# Patient Record
Sex: MALE | Race: WHITE | Employment: OTHER | URBAN - METROPOLITAN AREA
[De-identification: names, ages, dates, MRNs, and addresses within clinical notes are randomized per-mention and may not be internally consistent; named-entity substitution may affect disease eponyms.]

---

## 2021-12-10 ENCOUNTER — HOME HEALTH ADMISSION (OUTPATIENT)
Dept: HOME HEALTH SERVICES | Facility: HOME HEALTH | Age: 86
End: 2021-12-10
Payer: MEDICARE

## 2021-12-12 ENCOUNTER — HOME CARE VISIT (OUTPATIENT)
Dept: SCHEDULING | Facility: HOME HEALTH | Age: 86
End: 2021-12-12
Payer: MEDICARE

## 2021-12-12 VITALS
OXYGEN SATURATION: 99 % | TEMPERATURE: 97.8 F | DIASTOLIC BLOOD PRESSURE: 70 MMHG | SYSTOLIC BLOOD PRESSURE: 122 MMHG | RESPIRATION RATE: 18 BRPM | HEART RATE: 84 BPM

## 2021-12-12 PROCEDURE — 400013 HH SOC

## 2021-12-12 PROCEDURE — G0151 HHCP-SERV OF PT,EA 15 MIN: HCPCS

## 2021-12-15 ENCOUNTER — HOME CARE VISIT (OUTPATIENT)
Dept: SCHEDULING | Facility: HOME HEALTH | Age: 86
End: 2021-12-15
Payer: MEDICARE

## 2021-12-15 ENCOUNTER — HOME CARE VISIT (OUTPATIENT)
Dept: HOME HEALTH SERVICES | Facility: HOME HEALTH | Age: 86
End: 2021-12-15
Payer: MEDICARE

## 2021-12-15 VITALS
OXYGEN SATURATION: 98 % | HEART RATE: 78 BPM | RESPIRATION RATE: 18 BRPM | TEMPERATURE: 97.8 F | SYSTOLIC BLOOD PRESSURE: 140 MMHG | DIASTOLIC BLOOD PRESSURE: 82 MMHG

## 2021-12-15 PROCEDURE — G0151 HHCP-SERV OF PT,EA 15 MIN: HCPCS

## 2021-12-21 ENCOUNTER — HOME CARE VISIT (OUTPATIENT)
Dept: SCHEDULING | Facility: HOME HEALTH | Age: 86
End: 2021-12-21
Payer: MEDICARE

## 2021-12-21 VITALS
OXYGEN SATURATION: 98 % | RESPIRATION RATE: 17 BRPM | SYSTOLIC BLOOD PRESSURE: 124 MMHG | DIASTOLIC BLOOD PRESSURE: 78 MMHG | HEART RATE: 78 BPM | TEMPERATURE: 97.4 F

## 2021-12-21 PROCEDURE — G0157 HHC PT ASSISTANT EA 15: HCPCS

## 2021-12-23 ENCOUNTER — HOME CARE VISIT (OUTPATIENT)
Dept: SCHEDULING | Facility: HOME HEALTH | Age: 86
End: 2021-12-23
Payer: MEDICARE

## 2021-12-28 ENCOUNTER — HOSPITAL ENCOUNTER (OUTPATIENT)
Dept: LAB | Age: 86
Discharge: HOME | End: 2021-12-28
Payer: MEDICARE

## 2021-12-28 DIAGNOSIS — D50.0 IRON DEFICIENCY ANEMIA DUE TO CHRONIC BLOOD LOSS: ICD-10-CM

## 2021-12-28 PROBLEM — I10 PRIMARY HYPERTENSION: Status: ACTIVE | Noted: 2021-12-28

## 2021-12-28 PROBLEM — I47.1 ATRIAL TACHYCARDIA (HCC): Status: ACTIVE | Noted: 2021-12-28

## 2021-12-28 LAB — FERRITIN SERPL-MCNC: 26 NG/ML (ref 8–388)

## 2021-12-28 PROCEDURE — 82728 ASSAY OF FERRITIN: CPT

## 2021-12-28 PROCEDURE — 36415 COLL VENOUS BLD VENIPUNCTURE: CPT

## 2021-12-29 ENCOUNTER — HOME CARE VISIT (OUTPATIENT)
Dept: HOME HEALTH SERVICES | Facility: HOME HEALTH | Age: 86
End: 2021-12-29
Payer: MEDICARE

## 2021-12-29 ENCOUNTER — HOME CARE VISIT (OUTPATIENT)
Dept: SCHEDULING | Facility: HOME HEALTH | Age: 86
End: 2021-12-29
Payer: MEDICARE

## 2021-12-29 VITALS
DIASTOLIC BLOOD PRESSURE: 80 MMHG | SYSTOLIC BLOOD PRESSURE: 134 MMHG | TEMPERATURE: 98.6 F | OXYGEN SATURATION: 97 % | HEART RATE: 60 BPM | RESPIRATION RATE: 16 BRPM

## 2021-12-29 PROCEDURE — G0157 HHC PT ASSISTANT EA 15: HCPCS

## 2021-12-31 ENCOUNTER — HOME CARE VISIT (OUTPATIENT)
Dept: SCHEDULING | Facility: HOME HEALTH | Age: 86
End: 2021-12-31
Payer: MEDICARE

## 2021-12-31 VITALS
HEART RATE: 78 BPM | OXYGEN SATURATION: 98 % | RESPIRATION RATE: 18 BRPM | DIASTOLIC BLOOD PRESSURE: 72 MMHG | TEMPERATURE: 97.8 F | SYSTOLIC BLOOD PRESSURE: 132 MMHG

## 2021-12-31 PROCEDURE — G0151 HHCP-SERV OF PT,EA 15 MIN: HCPCS

## 2022-01-07 PROBLEM — N18.30 STAGE 3 CHRONIC KIDNEY DISEASE, UNSPECIFIED WHETHER STAGE 3A OR 3B CKD (HCC): Status: ACTIVE | Noted: 2022-01-07

## 2022-03-18 PROBLEM — I10 PRIMARY HYPERTENSION: Status: ACTIVE | Noted: 2021-12-28

## 2022-03-19 PROBLEM — I47.1 ATRIAL TACHYCARDIA (HCC): Status: ACTIVE | Noted: 2021-12-28

## 2022-03-19 PROBLEM — D50.0 IRON DEFICIENCY ANEMIA DUE TO CHRONIC BLOOD LOSS: Status: ACTIVE | Noted: 2021-12-28

## 2022-03-19 PROBLEM — N18.30 STAGE 3 CHRONIC KIDNEY DISEASE, UNSPECIFIED WHETHER STAGE 3A OR 3B CKD (HCC): Status: ACTIVE | Noted: 2022-01-07

## 2022-04-01 PROBLEM — G30.9 ALZHEIMER'S DISEASE, UNSPECIFIED (CODE) (HCC): Status: ACTIVE | Noted: 2022-04-01

## 2022-04-01 PROBLEM — L98.499: Status: ACTIVE | Noted: 2022-04-01

## 2022-04-01 PROBLEM — R41.9 NEUROCOGNITIVE DISORDER: Status: ACTIVE | Noted: 2022-04-01

## 2022-05-23 RX ORDER — ATENOLOL 25 MG/1
TABLET ORAL
Qty: 90 TABLET | Refills: 1 | Status: SHIPPED | OUTPATIENT
Start: 2022-05-23 | End: 2022-07-08

## 2022-07-01 DIAGNOSIS — I10 PRIMARY HYPERTENSION: Primary | ICD-10-CM

## 2022-07-01 DIAGNOSIS — I10 PRIMARY HYPERTENSION: ICD-10-CM

## 2022-07-01 LAB
ALBUMIN SERPL-MCNC: 4 G/DL (ref 3.2–4.6)
ALBUMIN/GLOB SERPL: 1.5 {RATIO} (ref 1.2–3.5)
ALP SERPL-CCNC: 96 U/L (ref 50–136)
ALT SERPL-CCNC: 19 U/L (ref 12–65)
ANION GAP SERPL CALC-SCNC: 4 MMOL/L (ref 7–16)
AST SERPL-CCNC: 14 U/L (ref 15–37)
BASOPHILS # BLD: 0.1 K/UL (ref 0–0.2)
BASOPHILS NFR BLD: 1 % (ref 0–2)
BILIRUB SERPL-MCNC: 0.7 MG/DL (ref 0.2–1.1)
BUN SERPL-MCNC: 21 MG/DL (ref 8–23)
CALCIUM SERPL-MCNC: 9.1 MG/DL (ref 8.3–10.4)
CHLORIDE SERPL-SCNC: 106 MMOL/L (ref 98–107)
CHOLEST SERPL-MCNC: 164 MG/DL
CO2 SERPL-SCNC: 31 MMOL/L (ref 21–32)
CREAT SERPL-MCNC: 1.3 MG/DL (ref 0.8–1.5)
DIFFERENTIAL METHOD BLD: NORMAL
EOSINOPHIL # BLD: 0.1 K/UL (ref 0–0.8)
EOSINOPHIL NFR BLD: 1 % (ref 0.5–7.8)
ERYTHROCYTE [DISTWIDTH] IN BLOOD BY AUTOMATED COUNT: 12.9 % (ref 11.9–14.6)
GLOBULIN SER CALC-MCNC: 2.6 G/DL (ref 2.3–3.5)
GLUCOSE SERPL-MCNC: 98 MG/DL (ref 65–100)
HCT VFR BLD AUTO: 45.6 % (ref 41.1–50.3)
HDLC SERPL-MCNC: 62 MG/DL (ref 40–60)
HDLC SERPL: 2.6 {RATIO}
HGB BLD-MCNC: 15.2 G/DL (ref 13.6–17.2)
IMM GRANULOCYTES # BLD AUTO: 0 K/UL (ref 0–0.5)
IMM GRANULOCYTES NFR BLD AUTO: 0 % (ref 0–5)
LDLC SERPL CALC-MCNC: 79 MG/DL
LYMPHOCYTES # BLD: 1.1 K/UL (ref 0.5–4.6)
LYMPHOCYTES NFR BLD: 16 % (ref 13–44)
MCH RBC QN AUTO: 30 PG (ref 26.1–32.9)
MCHC RBC AUTO-ENTMCNC: 33.3 G/DL (ref 31.4–35)
MCV RBC AUTO: 90.1 FL (ref 79.6–97.8)
MONOCYTES # BLD: 0.6 K/UL (ref 0.1–1.3)
MONOCYTES NFR BLD: 9 % (ref 4–12)
NEUTS SEG # BLD: 5 K/UL (ref 1.7–8.2)
NEUTS SEG NFR BLD: 73 % (ref 43–78)
NRBC # BLD: 0 K/UL (ref 0–0.2)
PLATELET # BLD AUTO: 271 K/UL (ref 150–450)
PMV BLD AUTO: 9.5 FL (ref 9.4–12.3)
POTASSIUM SERPL-SCNC: 4.1 MMOL/L (ref 3.5–5.1)
PROT SERPL-MCNC: 6.6 G/DL (ref 6.3–8.2)
RBC # BLD AUTO: 5.06 M/UL (ref 4.23–5.6)
SODIUM SERPL-SCNC: 141 MMOL/L (ref 138–145)
TRIGL SERPL-MCNC: 115 MG/DL (ref 35–150)
TSH, 3RD GENERATION: 1.56 UIU/ML (ref 0.36–3.74)
VLDLC SERPL CALC-MCNC: 23 MG/DL (ref 6–23)
WBC # BLD AUTO: 6.8 K/UL (ref 4.3–11.1)

## 2022-07-08 ENCOUNTER — OFFICE VISIT (OUTPATIENT)
Dept: INTERNAL MEDICINE CLINIC | Facility: CLINIC | Age: 87
End: 2022-07-08
Payer: COMMERCIAL

## 2022-07-08 VITALS
DIASTOLIC BLOOD PRESSURE: 86 MMHG | BODY MASS INDEX: 29.25 KG/M2 | OXYGEN SATURATION: 97 % | SYSTOLIC BLOOD PRESSURE: 130 MMHG | HEART RATE: 80 BPM | WEIGHT: 182 LBS | HEIGHT: 66 IN

## 2022-07-08 DIAGNOSIS — N18.30 STAGE 3 CHRONIC KIDNEY DISEASE, UNSPECIFIED WHETHER STAGE 3A OR 3B CKD (HCC): ICD-10-CM

## 2022-07-08 DIAGNOSIS — Z23 NEED FOR TDAP VACCINATION: Primary | ICD-10-CM

## 2022-07-08 DIAGNOSIS — R53.1 WEAKNESS: ICD-10-CM

## 2022-07-08 DIAGNOSIS — R41.9 NEUROCOGNITIVE DISORDER: ICD-10-CM

## 2022-07-08 DIAGNOSIS — Z71.89 ADVANCE CARE PLANNING: ICD-10-CM

## 2022-07-08 DIAGNOSIS — Z00.00 MEDICARE ANNUAL WELLNESS VISIT, SUBSEQUENT: ICD-10-CM

## 2022-07-08 PROCEDURE — 99497 ADVNCD CARE PLAN 30 MIN: CPT | Performed by: INTERNAL MEDICINE

## 2022-07-08 PROCEDURE — 99214 OFFICE O/P EST MOD 30 MIN: CPT | Performed by: INTERNAL MEDICINE

## 2022-07-08 PROCEDURE — 1123F ACP DISCUSS/DSCN MKR DOCD: CPT | Performed by: INTERNAL MEDICINE

## 2022-07-08 PROCEDURE — G0439 PPPS, SUBSEQ VISIT: HCPCS | Performed by: INTERNAL MEDICINE

## 2022-07-08 PROCEDURE — 1036F TOBACCO NON-USER: CPT | Performed by: INTERNAL MEDICINE

## 2022-07-08 PROCEDURE — G8417 CALC BMI ABV UP PARAM F/U: HCPCS | Performed by: INTERNAL MEDICINE

## 2022-07-08 PROCEDURE — G8427 DOCREV CUR MEDS BY ELIG CLIN: HCPCS | Performed by: INTERNAL MEDICINE

## 2022-07-08 RX ORDER — DONEPEZIL HYDROCHLORIDE 10 MG/1
10 TABLET, FILM COATED ORAL NIGHTLY
Qty: 90 TABLET | Refills: 3 | Status: SHIPPED | OUTPATIENT
Start: 2022-07-08

## 2022-07-08 RX ORDER — ATENOLOL 100 MG/1
50 TABLET ORAL DAILY
COMMUNITY
Start: 2022-06-29 | End: 2022-10-14

## 2022-07-08 ASSESSMENT — PATIENT HEALTH QUESTIONNAIRE - PHQ9
SUM OF ALL RESPONSES TO PHQ QUESTIONS 1-9: 0
SUM OF ALL RESPONSES TO PHQ QUESTIONS 1-9: 0
SUM OF ALL RESPONSES TO PHQ9 QUESTIONS 1 & 2: 0
SUM OF ALL RESPONSES TO PHQ QUESTIONS 1-9: 0
1. LITTLE INTEREST OR PLEASURE IN DOING THINGS: 0
SUM OF ALL RESPONSES TO PHQ QUESTIONS 1-9: 0
2. FEELING DOWN, DEPRESSED OR HOPELESS: 0

## 2022-07-08 ASSESSMENT — ENCOUNTER SYMPTOMS
COUGH: 0
SHORTNESS OF BREATH: 0
ABDOMINAL PAIN: 0

## 2022-07-08 NOTE — ACP (ADVANCE CARE PLANNING)
Advance Care Planning     Advance Care Planning (ACP) Physician/NP/PA Conversation    Date of Conversation: 7/8/2022  Conducted with: Patient with Decision Making Capacity    Healthcare Decision Maker:      Primary Decision Maker: Colten More - [phone]    Click here to complete Healthcare Decision Makers including selection of the Healthcare Decision Maker Relationship (ie \"Primary\")  Today we documented Decision Maker(s) consistent with Legal Next of Kin hierarchy. Care Preferences:    Hospitalization: \"If your health worsens and it becomes clear that your chance of recovery is unlikely, what would be your preference regarding hospitalization? \"  The patient would prefer comfort-focused treatment without hospitalization. Ventilation: \"If you were unable to breath on your own and your chance of recovery was unlikely, what would be your preference about the use of a ventilator (breathing machine) if it was available to you? \"  Not sure    Resuscitation: \"In the event your heart stopped as a result of an underlying serious health condition, would you want attempts made to restart your heart, or would you prefer a natural death? \"  No, do NOT attempt to resuscitate.     treatment goals, benefit/burden of treatment options, artificial nutrition, ventilation preferences, hospitalization preferences, resuscitation preferences, end of life care preferences (vegetative state/imminent death) and hospice care    Conversation Outcomes / Follow-Up Plan:  ACP in process - information provided, considering goals and options  Reviewed DNR/DNI and patient elects DNR order - completed portable DNR form & placed order    Length of Voluntary ACP Conversation in minutes:  16 minutes    Elizabeth Caruso MD

## 2022-07-08 NOTE — PROGRESS NOTES
8.2 K/UL    Absolute Lymph # 1.1 0.5 - 4.6 K/UL    Absolute Mono # 0.6 0.1 - 1.3 K/UL    Absolute Eos # 0.1 0.0 - 0.8 K/UL    Basophils Absolute 0.1 0.0 - 0.2 K/UL    Absolute Immature Granulocyte 0.0 0.0 - 0.5 K/UL   Comprehensive Metabolic Panel    Collection Time: 07/01/22  9:22 AM   Result Value Ref Range    Sodium 141 138 - 145 mmol/L    Potassium 4.1 3.5 - 5.1 mmol/L    Chloride 106 98 - 107 mmol/L    CO2 31 21 - 32 mmol/L    Anion Gap 4 (L) 7 - 16 mmol/L    Glucose 98 65 - 100 mg/dL    BUN 21 8 - 23 MG/DL    CREATININE 1.30 0.8 - 1.5 MG/DL    GFR African American >60 >60 ml/min/1.73m2    GFR Non- 55 (L) >60 ml/min/1.73m2    Calcium 9.1 8.3 - 10.4 MG/DL    Total Bilirubin 0.7 0.2 - 1.1 MG/DL    ALT 19 12 - 65 U/L    AST 14 (L) 15 - 37 U/L    Alk Phosphatase 96 50 - 136 U/L    Total Protein 6.6 6.3 - 8.2 g/dL    Albumin 4.0 3.2 - 4.6 g/dL    Globulin 2.6 2.3 - 3.5 g/dL    Albumin/Globulin Ratio 1.5 1.2 - 3.5     Lipid Panel    Collection Time: 07/01/22  9:22 AM   Result Value Ref Range    Cholesterol, Total 164 <200 MG/DL    Triglycerides 115 35 - 150 MG/DL    HDL 62 (H) 40 - 60 MG/DL    LDL Calculated 79 <100 MG/DL    VLDL Cholesterol Calculated 23 6.0 - 23.0 MG/DL    Chol/HDL Ratio 2.6           ASSESSMENT and PLAN    Jonh Jenna was seen today for medicare aw. Diagnoses and all orders for this visit:    Need for Tdap vaccination  -     Tetanus-Diphth-Acell Pertussis ([address]) 5-2.5-18.5 LF-MCG/0.5 injection; Inject 0.5 mLs into the muscle once for 1 dose    Medicare annual wellness visit, subsequent    Advance care planning  -     DO NOT RESUSCITATE (DNR)    Neurocognitive disorder  -     External Referral to Speech Therapy  -     donepezil (ARICEPT) 10 MG tablet; Take 1 tablet by mouth nightly    Weakness  -     External Referral to Physical Therapy    Stage 3 chronic kidney disease, unspecified whether stage 3a or 3b CKD ([facility] [address]  -     Comprehensive Metabolic Panel;  Future          Return in about 3 months (around 10/8/2022) for Neurocognitive disorder(labs ~ 3 days before). Medicare Annual Wellness Visit    Patrick Ingram is here for Medicare AWV    Assessment & Plan   Need for Tdap vaccination  -     Tetanus-Diphth-Acell Pertussis ([address]) 5-2.5-18.5 LF-MCG/0.5 injection; Inject 0.5 mLs into the muscle once for 1 dose, Disp-0.5 mL, R-0Normal  Medicare annual wellness visit, subsequent  Advance care planning  -     DO NOT RESUSCITATE (DNR)  Neurocognitive disorder  -     External Referral to Speech Therapy  -     donepezil (ARICEPT) 10 MG tablet; Take 1 tablet by mouth nightly, Disp-90 tablet, R-3Normal  Weakness  -     External Referral to Physical Therapy  Stage 3 chronic kidney disease, unspecified whether stage 3a or 3b CKD ([facility] [address]  -     Comprehensive Metabolic Panel; Future      Recommendations for Preventive Services Due: see orders and patient instructions/AVS.  Recommended screening schedule for the next 5-10 years is provided to the patient in written form: see Patient Instructions/AVS.     Return in about 3 months (around 10/8/2022) for Neurocognitive disorder(labs ~ 3 days before). Subjective       Patient's complete Health Risk Assessment and screening values have been reviewed and are found in Flowsheets. The following problems were reviewed today and where indicated follow up appointments were made and/or referrals ordered. Positive Risk Factor Screenings with Interventions:    Fall Risk:  Do you feel unsteady or are you worried about falling? : (!) yes  2 or more falls in past year?: (!) yes  Fall with injury in past year?: (!) yes     Fall Risk Interventions:    · Physical therapy referral ordered for strength and balance training    Cognitive:   Words recalled: 0 Words Recalled  Clock Drawing Test (CDT): (!) Abnormal  Total Score Interpretation: Abnormal Mini-Cog    Cognitive Impairment Interventions:  · speech therapy ordered for cognitive evaluation General Health and ACP:       Advance Directives     Power of  Living Will ACP-Advance Directive ACP-Power of     Not on File Not on File Not on File Not on File      General Health Risk Interventions:  · No Living Will: Advance Care Planning addressed with patient today              Objective   Vitals:    07/08/22 1105   BP: 130/86   Pulse: 80   SpO2: 97%   Weight: 182 lb (82.6 kg)   Height: 5' 6\" (1.676 m)      Body mass index is 29.38 kg/m². No Known Allergies  Prior to Visit Medications    Medication Sig Taking? Authorizing Provider   atenolol (TENORMIN) 100 MG tablet Take 50 mg by mouth daily  Yes Historical Provider, MD   Tetanus-Diphth-Acell Pertussis (BOOSTRIX) 5-2.5-18.5 LF-MCG/0.5 injection Inject 0.5 mLs into the muscle once for 1 dose Yes Beverly Christian MD   donepezil (ARICEPT) 10 MG tablet Take 1 tablet by mouth nightly Yes Beverly Christian MD   aspirin 81 MG EC tablet Take 81 mg by mouth daily Yes Ar Automatic Reconciliation   Latanoprostene Bunod (VYZULTA) 0.024 % SOLN LOCATION BOTH EYES. PLACE ONE DROP IN BOTH EYES EVERY NIGHT.  Yes Ar Automatic Reconciliation       CareTeam (Including outside providers/suppliers regularly involved in providing care):   Patient Care Team:  Beverly Christian MD as PCP - Mayra Hector MD as PCP - [facility] Empaneled Provider     Reviewed and updated this visit:  Tobacco  Allergies  Meds  Problems  Med Hx  Surg Hx  Soc Hx  Fam Hx

## 2022-08-02 ENCOUNTER — TELEPHONE (OUTPATIENT)
Dept: INTERNAL MEDICINE CLINIC | Facility: CLINIC | Age: 87
End: 2022-08-02

## 2022-08-02 NOTE — TELEPHONE ENCOUNTER
His daugther, Miss Georgiana Abreu called on his behalf. He fell last night. He was taken to [facility] by EMS because it was the closest hospital.  He broke his collarbone. Then, he had a second fall and broke his right hand. He's extremely unsteady. She would like to get a wheel chair, but she doesn't know how to get one. Mr. Caitlin Belcher has been on a list for assisting living.       She would like Dr. Etienne Leach to look at his progress notes from his visit at [facility] before talking to him tomorrow during a  virtual appointment scheduled at 9:00am.

## 2022-08-03 ENCOUNTER — TELEPHONE (OUTPATIENT)
Dept: INTERNAL MEDICINE CLINIC | Facility: CLINIC | Age: 87
End: 2022-08-03

## 2022-08-03 ENCOUNTER — TELEMEDICINE (OUTPATIENT)
Dept: INTERNAL MEDICINE CLINIC | Facility: CLINIC | Age: 87
End: 2022-08-03
Payer: COMMERCIAL

## 2022-08-03 DIAGNOSIS — R52 PAIN: ICD-10-CM

## 2022-08-03 DIAGNOSIS — Z91.81 HISTORY OF FALL: Primary | ICD-10-CM

## 2022-08-03 PROCEDURE — G8427 DOCREV CUR MEDS BY ELIG CLIN: HCPCS | Performed by: INTERNAL MEDICINE

## 2022-08-03 PROCEDURE — 1123F ACP DISCUSS/DSCN MKR DOCD: CPT | Performed by: INTERNAL MEDICINE

## 2022-08-03 PROCEDURE — G8417 CALC BMI ABV UP PARAM F/U: HCPCS | Performed by: INTERNAL MEDICINE

## 2022-08-03 PROCEDURE — 99215 OFFICE O/P EST HI 40 MIN: CPT | Performed by: INTERNAL MEDICINE

## 2022-08-03 PROCEDURE — 1036F TOBACCO NON-USER: CPT | Performed by: INTERNAL MEDICINE

## 2022-08-03 RX ORDER — NAPROXEN 500 MG/1
500 TABLET ORAL 2 TIMES DAILY WITH MEALS
Qty: 30 TABLET | Refills: 0 | Status: SHIPPED | OUTPATIENT
Start: 2022-08-03 | End: 2022-10-14

## 2022-08-03 ASSESSMENT — ENCOUNTER SYMPTOMS
SHORTNESS OF BREATH: 0
COUGH: 0
ABDOMINAL PAIN: 0

## 2022-08-03 NOTE — PROGRESS NOTES
8/3/2022    TELEHEALTH EVALUATION -- Audio/Visual (During HPEGN-00 public health emergency)    HPI:    Emilia Nguyen (:  1932) has requested an audio/video evaluation for the following concern(s):  L[facility] at [facility] [place] - planning to move to secured assisted living/memory care  S/p fall 2 days ago evening - went to ED, had broken his right collar bone, was discharged, fell again yesterday, went to urgent care() was found to have a right broken hand; has a sling for his shoulder, was given levaquin for hand abrasion    Review of Systems   Constitutional:  Negative for fever. Respiratory:  Negative for cough and shortness of breath. Cardiovascular:  Negative for chest pain and leg swelling. Gastrointestinal:  Negative for abdominal pain. Psychiatric/Behavioral:  Negative for behavioral problems and confusion. Prior to Visit Medications    Medication Sig Taking? Authorizing Provider   naproxen (NAPROSYN) 500 MG tablet Take 1 tablet by mouth in the morning and 1 tablet in the evening. Take with meals. Yes Violet Dominguez MD   atenolol (TENORMIN) 100 MG tablet Take 50 mg by mouth daily   Historical Provider, MD   donepezil (ARICEPT) 10 MG tablet Take 1 tablet by mouth nightly  Violet Dominguez MD   aspirin 81 MG EC tablet Take 81 mg by mouth daily  Ar Automatic Reconciliation   Latanoprostene Bunod (VYZULTA) 0.024 % SOLN LOCATION BOTH EYES. PLACE ONE DROP IN BOTH EYES EVERY NIGHT.   Ar Automatic Reconciliation       Social History     Tobacco Use    Smoking status: Former     Packs/day: 1.00     Types: Cigarettes    Smokeless tobacco: Never        No Known Allergies    PHYSICAL EXAMINATION:  [ INSTRUCTIONS:  \"[x]\" Indicates a positive item  \"[]\" Indicates a negative item  -- DELETE ALL ITEMS NOT EXAMINED]  Vital Signs: (As obtained by patient/caregiver or practitioner observation)    Blood pressure-  Heart rate-    Respiratory rate-    Temperature-  Pulse oximetry- Constitutional: [x] Appears well-developed and well-nourished [] No apparent distress      [] Abnormal-   Mental status  [x] Alert and awake  [] Oriented to person/place/time []Able to follow commands      Eyes:  EOM    [x]  Normal  [] Abnormal-  Sclera  []  Normal  [] Abnormal -         Discharge []  None visible  [] Abnormal -    HENT:   [x] Normocephalic, atraumatic. [] Abnormal   [] Mouth/Throat: Mucous membranes are moist.     External Ears [] Normal  [] Abnormal-     Neck: [] No visualized mass     Pulmonary/Chest: [x] Respiratory effort normal.  [] No visualized signs of difficulty breathing or respiratory distress        [] Abnormal-      Musculoskeletal:   [] Normal gait with no signs of ataxia         [] Normal range of motion of neck        [x] Abnormal- tight shoulder and hand bruise      Neurological:        [] No Facial Asymmetry (Cranial nerve 7 motor function) (limited exam to video visit)          [] No gaze palsy        [] Abnormal-         Skin:        [] No significant exanthematous lesions or discoloration noted on facial skin         [x] Abnormal- abrasion on right hand           Psychiatric:       [] Normal Affect [] No Hallucinations        [] Abnormal-     Other pertinent observable physical exam findings-     ASSESSMENT/PLAN:  1. History of fall      2. Pain    - naproxen (NAPROSYN) 500 MG tablet; Take 1 tablet by mouth in the morning and 1 tablet in the evening. Take with meals. Dispense: 30 tablet; Refill: 0  Spoke to [facility] about evaluation - will be done later today  Have appt. with hand center tomorrow and referral for [facility]  No follow-ups on file. Haresh Hugh, was evaluated through a synchronous (real-time) audio-video encounter. The patient (or guardian if applicable) is aware that this is a billable service, which includes applicable co-pays. This Virtual Visit was conducted with patient's (and/or legal guardian's) consent.  The visit was conducted pursuant to the emergency declaration under the [facility], [facility] authority and the [employer] and [employer] General Act. Patient identification was verified, and a caregiver was present when appropriate. The patient was located at Home: [facility]  [address] Provider was located at Home ([place] [address]): [place]. Total time spent on this encounter:  40  min    --Arpit Mcgovern MD on 8/3/2022 at 9:56 AM    An electronic signature was used to authenticate this note.

## 2022-08-03 NOTE — TELEPHONE ENCOUNTER
Ashley Osei with [facility] called advising that patient must have referral to [address] [place] [address] [place] due to insurance precertification; please advise her which home health patient is referred to and they will contact [facility]

## 2022-08-03 NOTE — TELEPHONE ENCOUNTER
Miss Luzmaria Padilla from [facility] would like to speak with you about the home health referral.  Please call her back at [phone].

## 2022-08-04 ENCOUNTER — TELEPHONE (OUTPATIENT)
Dept: INTERNAL MEDICINE CLINIC | Facility: CLINIC | Age: 87
End: 2022-08-04

## 2022-08-04 NOTE — TELEPHONE ENCOUNTER
Patient asking for a referral for [address] [place] [address] rehab [place].  There is an  referral in the system, but Jazmine Dorman from the clinic stated she would need a new one to schedule the patient    Fax [phone]

## 2022-08-08 ENCOUNTER — HOME HEALTH ADMISSION (OUTPATIENT)
Dept: HOME HEALTH SERVICES | Facility: HOME HEALTH | Age: 87
End: 2022-08-08

## 2022-08-08 ENCOUNTER — TELEPHONE (OUTPATIENT)
Dept: INTERNAL MEDICINE CLINIC | Facility: CLINIC | Age: 87
End: 2022-08-08

## 2022-08-08 DIAGNOSIS — R53.1 WEAKNESS: Primary | ICD-10-CM

## 2022-08-08 NOTE — TELEPHONE ENCOUNTER
Dr. Avery Peck referred the patient to [facility].  Please fax his most recent office notes to [facility].     Office#: [phone]  Fax#: [phone]

## 2022-08-10 ENCOUNTER — TELEPHONE (OUTPATIENT)
Dept: INTERNAL MEDICINE CLINIC | Facility: CLINIC | Age: 87
End: 2022-08-10

## 2022-08-10 DIAGNOSIS — Z91.81 HISTORY OF FALL: ICD-10-CM

## 2022-08-10 DIAGNOSIS — R53.1 WEAKNESS: Primary | ICD-10-CM

## 2022-08-10 NOTE — TELEPHONE ENCOUNTER
Patient was not approved to go to [facility]. Will be using [facility] and they need fax orders to Fax: [phone]. Patient's daughter, Leonora Velazco, wanted to apologize for the complications during this process and thank the doctor for his patience.

## 2022-08-11 ENCOUNTER — TELEMEDICINE (OUTPATIENT)
Dept: INTERNAL MEDICINE CLINIC | Facility: CLINIC | Age: 87
End: 2022-08-11
Payer: COMMERCIAL

## 2022-08-11 DIAGNOSIS — R53.1 WEAKNESS: ICD-10-CM

## 2022-08-11 DIAGNOSIS — Z91.81 HISTORY OF FALL: Primary | ICD-10-CM

## 2022-08-11 PROCEDURE — G8417 CALC BMI ABV UP PARAM F/U: HCPCS | Performed by: INTERNAL MEDICINE

## 2022-08-11 PROCEDURE — 1123F ACP DISCUSS/DSCN MKR DOCD: CPT | Performed by: INTERNAL MEDICINE

## 2022-08-11 PROCEDURE — G8428 CUR MEDS NOT DOCUMENT: HCPCS | Performed by: INTERNAL MEDICINE

## 2022-08-11 PROCEDURE — 1036F TOBACCO NON-USER: CPT | Performed by: INTERNAL MEDICINE

## 2022-08-11 PROCEDURE — 99213 OFFICE O/P EST LOW 20 MIN: CPT | Performed by: INTERNAL MEDICINE

## 2022-08-11 ASSESSMENT — ENCOUNTER SYMPTOMS
SHORTNESS OF BREATH: 0
COUGH: 0
ABDOMINAL PAIN: 0

## 2022-08-11 NOTE — PROGRESS NOTES
2022    TELEHEALTH EVALUATION -- Audio/Visual (During JLBUQ-88 public health emergency)    HPI:    Rufina Hooks (:  1932) has requested an audio/video evaluation for the following concern(s):    S/p fall 10 days ago - feeling better, wants to see [facility] home [facility], saw ortho and hand surgery    Review of Systems   Constitutional:  Negative for fever. Respiratory:  Negative for cough and shortness of breath. Cardiovascular:  Negative for chest pain and leg swelling. Gastrointestinal:  Negative for abdominal pain. Psychiatric/Behavioral:  Negative for behavioral problems and confusion. Prior to Visit Medications    Medication Sig Taking? Authorizing Provider   naproxen (NAPROSYN) 500 MG tablet Take 1 tablet by mouth in the morning and 1 tablet in the evening. Take with meals. Anamika Peña MD   atenolol (TENORMIN) 100 MG tablet Take 50 mg by mouth daily   Historical Provider, MD   donepezil (ARICEPT) 10 MG tablet Take 1 tablet by mouth nightly  Anamika Peña MD   aspirin 81 MG EC tablet Take 81 mg by mouth daily  Ar Automatic Reconciliation   Latanoprostene Bunod (VYZULTA) 0.024 % SOLN LOCATION BOTH EYES. PLACE ONE DROP IN BOTH EYES EVERY NIGHT.   Ar Automatic Reconciliation       Social History     Tobacco Use    Smoking status: Former     Packs/day: 1.00     Types: Cigarettes    Smokeless tobacco: Never        PHYSICAL EXAMINATION:  [ INSTRUCTIONS:  \"[x]\" Indicates a positive item  \"[]\" Indicates a negative item  -- DELETE ALL ITEMS NOT EXAMINED]  Vital Signs: (As obtained by patient/caregiver or practitioner observation)    Blood pressure-  Heart rate-    Respiratory rate-    Temperature-  Pulse oximetry-     Constitutional: [x] Appears well-developed and well-nourished [] No apparent distress      [] Abnormal-   Mental status  [x] Alert and awake  [] Oriented to person/place/time []Able to follow commands      Eyes:  EOM    [x]  Normal  [] Abnormal-  Sclera  [] Normal  [] Abnormal -         Discharge []  None visible  [] Abnormal -    HENT:   [] Normocephalic, atraumatic. [] Abnormal   [] Mouth/Throat: Mucous membranes are moist.     External Ears [] Normal  [] Abnormal-     Neck: [] No visualized mass     Pulmonary/Chest: [x] Respiratory effort normal.  [] No visualized signs of difficulty breathing or respiratory distress        [] Abnormal-      Musculoskeletal:   [] Normal gait with no signs of ataxia         [] Normal range of motion of neck        [] Abnormal-       Neurological:        [] No Facial Asymmetry (Cranial nerve 7 motor function) (limited exam to video visit)          [] No gaze palsy        [] Abnormal-         Skin:        [] No significant exanthematous lesions or discoloration noted on facial skin         [] Abnormal-            Psychiatric:       [] Normal Affect [] No Hallucinations        [] Abnormal-     Other pertinent observable physical exam findings-     ASSESSMENT/PLAN:  1. History of fall    - External Referral to Home Health    2. Weakness  [facility]  - External Referral to Home Health    No follow-ups on file. Micheal Isbell, was evaluated through a synchronous (real-time) audio-video encounter. The patient (or guardian if applicable) is aware that this is a billable service, which includes applicable co-pays. This Virtual Visit was conducted with patient's (and/or legal guardian's) consent. The visit was conducted pursuant to the emergency declaration under the [address] [place] [address], [address] [place] [address] authority and the [employer] and [employer] General Act. Patient identification was verified, and a caregiver was present when appropriate. The patient was located at Home: [address] [place] [address] Rd.  [address] Provider was located at Home ([place] [address]): [place].        Total time spent on this encounter: Not billed by time    --Frank Corbin MD on 8/11/2022 at 12:34 PM    An electronic signature was used to authenticate this note.

## 2022-08-16 DIAGNOSIS — Z91.81 HISTORY OF FALL: ICD-10-CM

## 2022-08-16 DIAGNOSIS — R53.1 WEAKNESS: Primary | ICD-10-CM

## 2022-10-12 ENCOUNTER — TELEPHONE (OUTPATIENT)
Dept: INTERNAL MEDICINE CLINIC | Facility: CLINIC | Age: 87
End: 2022-10-12

## 2022-10-12 NOTE — TELEPHONE ENCOUNTER
Requesting verbal orders for Speech therapy once a week for five weeks and one every two weeks for one more session to address cognition.  [phone]

## 2022-10-14 ENCOUNTER — OFFICE VISIT (OUTPATIENT)
Dept: INTERNAL MEDICINE CLINIC | Facility: CLINIC | Age: 87
End: 2022-10-14
Payer: COMMERCIAL

## 2022-10-14 VITALS
HEIGHT: 66 IN | OXYGEN SATURATION: 99 % | WEIGHT: 176 LBS | SYSTOLIC BLOOD PRESSURE: 128 MMHG | BODY MASS INDEX: 28.28 KG/M2 | DIASTOLIC BLOOD PRESSURE: 64 MMHG | HEART RATE: 63 BPM

## 2022-10-14 DIAGNOSIS — I10 ESSENTIAL HYPERTENSION: Primary | ICD-10-CM

## 2022-10-14 DIAGNOSIS — I10 ESSENTIAL HYPERTENSION: ICD-10-CM

## 2022-10-14 LAB
ALBUMIN SERPL-MCNC: 3.3 G/DL (ref 3.2–4.6)
ALBUMIN/GLOB SERPL: 1.1 {RATIO} (ref 0.4–1.6)
ALP SERPL-CCNC: 94 U/L (ref 50–136)
ALT SERPL-CCNC: 11 U/L (ref 12–65)
ANION GAP SERPL CALC-SCNC: 5 MMOL/L (ref 2–11)
AST SERPL-CCNC: 11 U/L (ref 15–37)
BILIRUB SERPL-MCNC: 0.5 MG/DL (ref 0.2–1.1)
BUN SERPL-MCNC: 26 MG/DL (ref 8–23)
CALCIUM SERPL-MCNC: 9.2 MG/DL (ref 8.3–10.4)
CHLORIDE SERPL-SCNC: 107 MMOL/L (ref 101–110)
CO2 SERPL-SCNC: 27 MMOL/L (ref 21–32)
CREAT SERPL-MCNC: 1.4 MG/DL (ref 0.8–1.5)
GLOBULIN SER CALC-MCNC: 3 G/DL (ref 2.8–4.5)
GLUCOSE SERPL-MCNC: 84 MG/DL (ref 65–100)
POTASSIUM SERPL-SCNC: 4.7 MMOL/L (ref 3.5–5.1)
PROT SERPL-MCNC: 6.3 G/DL (ref 6.3–8.2)
SODIUM SERPL-SCNC: 139 MMOL/L (ref 133–143)

## 2022-10-14 PROCEDURE — 1036F TOBACCO NON-USER: CPT | Performed by: INTERNAL MEDICINE

## 2022-10-14 PROCEDURE — G8427 DOCREV CUR MEDS BY ELIG CLIN: HCPCS | Performed by: INTERNAL MEDICINE

## 2022-10-14 PROCEDURE — 99214 OFFICE O/P EST MOD 30 MIN: CPT | Performed by: INTERNAL MEDICINE

## 2022-10-14 PROCEDURE — G8417 CALC BMI ABV UP PARAM F/U: HCPCS | Performed by: INTERNAL MEDICINE

## 2022-10-14 PROCEDURE — 1123F ACP DISCUSS/DSCN MKR DOCD: CPT | Performed by: INTERNAL MEDICINE

## 2022-10-14 PROCEDURE — G8484 FLU IMMUNIZE NO ADMIN: HCPCS | Performed by: INTERNAL MEDICINE

## 2022-10-14 RX ORDER — ATENOLOL 50 MG/1
50 TABLET ORAL DAILY
COMMUNITY

## 2022-10-14 ASSESSMENT — PATIENT HEALTH QUESTIONNAIRE - PHQ9
SUM OF ALL RESPONSES TO PHQ QUESTIONS 1-9: 1
2. FEELING DOWN, DEPRESSED OR HOPELESS: 1
SUM OF ALL RESPONSES TO PHQ QUESTIONS 1-9: 1
1. LITTLE INTEREST OR PLEASURE IN DOING THINGS: 0
SUM OF ALL RESPONSES TO PHQ9 QUESTIONS 1 & 2: 1

## 2022-10-14 ASSESSMENT — ENCOUNTER SYMPTOMS
ABDOMINAL PAIN: 0
COUGH: 0
SHORTNESS OF BREATH: 0

## 2022-10-14 NOTE — PROGRESS NOTES
SUBJECTIVE:   Sadaf Husain is a 80 y.o. male seen for a visit regarding No chief complaint on file. HPI  Now in assisted living - [place] [facility]    HTN, Diastolic dysfunction, Atrial tacycardia  - no headache, saw cardiology, on ASA/Atenolol-50mg  Glaucoma - sees ophthalmology  CKD-3 - stable  Leg weakness -generalized  Onychomycosis - feet - has not seen podiatry yet  Neurocognitive impairment - mild to moderate - Aricept 10 mg po daily  Does not want to take Shingles vaccine at this time, encouraged to think about it    Past Medical History, Past Surgical History, Family history, Social History, and Medications were all reviewed with the patient today and updated as necessary. Current Outpatient Medications   Medication Sig Dispense Refill    atenolol (TENORMIN) 50 MG tablet Take 50 mg by mouth daily      donepezil (ARICEPT) 10 MG tablet Take 1 tablet by mouth nightly 90 tablet 3    aspirin 81 MG EC tablet Take 81 mg by mouth daily      Latanoprostene Bunod (VYZULTA) 0.024 % SOLN LOCATION BOTH EYES. PLACE ONE DROP IN BOTH EYES EVERY NIGHT. No current facility-administered medications for this visit. No Known Allergies  Patient Active Problem List   Diagnosis    Primary hypertension    Stage 3 chronic kidney disease, unspecified whether stage 3a or 3b CKD (HCC)    Atrial tachycardia (HCC)    Iron deficiency anemia due to chronic blood loss    Neurocognitive disorder    Alzheimer's disease, unspecified (CODE) ([facility] [address]    Skin ulcer of forearm, with unspecified severity ([facility] [address]     History reviewed. No pertinent past medical history. History reviewed. No pertinent surgical history. History reviewed. No pertinent family history. Social History     Tobacco Use    Smoking status: Former     Packs/day: 1.00     Types: Cigarettes    Smokeless tobacco: Never   Substance Use Topics    Alcohol use: Not on file         Review of Systems   Constitutional:  Negative for fever.    Respiratory: Negative for cough and shortness of breath. Cardiovascular:  Negative for chest pain and leg swelling. Gastrointestinal:  Negative for abdominal pain. Psychiatric/Behavioral:  Negative for behavioral problems and confusion. OBJECTIVE:  /64 (Site: Left Upper Arm, Position: Sitting, Cuff Size: Medium Adult)   Pulse 63   Ht 5' 6\" (1.676 m)   Wt 176 lb (79.8 kg)   SpO2 99%   BMI 28.41 kg/m²      Physical Exam  Vitals and nursing note reviewed. Constitutional:       General: He is not in acute distress. Cardiovascular:      Rate and Rhythm: Normal rate and regular rhythm. Pulmonary:      Effort: Pulmonary effort is normal.      Breath sounds: No wheezing. Neurological:      General: No focal deficit present. Mental Status: He is oriented to person, place, and time. Psychiatric:         Mood and Affect: Mood normal.         Behavior: Behavior normal.       Medical problems and test results were reviewed with the patient today. No results found for this or any previous visit (from the past 672 hour(s)). ASSESSMENT and PLAN    Diagnoses and all orders for this visit:    Essential hypertension  -     Comprehensive Metabolic Panel; Future    Return in about 3 months (around 1/14/2023) for HTN. It took more than 30 min to care for this pt today  Over 50% of today's office visit was spent in face to face time in counseling   (may include anyor all of the following: reviewing test results, prognosis, importance of compliance, education about disease process, benefits of medications, instructions for management of acute flare-ups, and follow up plans).

## 2023-01-20 ENCOUNTER — OFFICE VISIT (OUTPATIENT)
Dept: INTERNAL MEDICINE CLINIC | Facility: CLINIC | Age: 88
End: 2023-01-20
Payer: COMMERCIAL

## 2023-01-20 VITALS
OXYGEN SATURATION: 99 % | DIASTOLIC BLOOD PRESSURE: 74 MMHG | BODY MASS INDEX: 27.96 KG/M2 | SYSTOLIC BLOOD PRESSURE: 126 MMHG | WEIGHT: 173.2 LBS | HEART RATE: 53 BPM

## 2023-01-20 DIAGNOSIS — G30.9 ALZHEIMER'S DISEASE, UNSPECIFIED (CODE) (HCC): ICD-10-CM

## 2023-01-20 DIAGNOSIS — I10 ESSENTIAL HYPERTENSION: Primary | ICD-10-CM

## 2023-01-20 DIAGNOSIS — I47.1 SUPRAVENTRICULAR TACHYCARDIA (HCC): ICD-10-CM

## 2023-01-20 DIAGNOSIS — N18.30 STAGE 3 CHRONIC KIDNEY DISEASE, UNSPECIFIED WHETHER STAGE 3A OR 3B CKD (HCC): ICD-10-CM

## 2023-01-20 DIAGNOSIS — I10 ESSENTIAL HYPERTENSION: ICD-10-CM

## 2023-01-20 PROBLEM — L98.499: Status: RESOLVED | Noted: 2022-04-01 | Resolved: 2023-01-20

## 2023-01-20 LAB
BASOPHILS # BLD: 0.1 K/UL (ref 0–0.2)
BASOPHILS NFR BLD: 1 % (ref 0–2)
DIFFERENTIAL METHOD BLD: ABNORMAL
EOSINOPHIL # BLD: 0.1 K/UL (ref 0–0.8)
EOSINOPHIL NFR BLD: 1 % (ref 0.5–7.8)
ERYTHROCYTE [DISTWIDTH] IN BLOOD BY AUTOMATED COUNT: 17.3 % (ref 11.9–14.6)
HCT VFR BLD AUTO: 41.1 % (ref 41.1–50.3)
HGB BLD-MCNC: 12.4 G/DL (ref 13.6–17.2)
IMM GRANULOCYTES # BLD AUTO: 0 K/UL (ref 0–0.5)
IMM GRANULOCYTES NFR BLD AUTO: 0 % (ref 0–5)
LYMPHOCYTES # BLD: 1.1 K/UL (ref 0.5–4.6)
LYMPHOCYTES NFR BLD: 15 % (ref 13–44)
MCH RBC QN AUTO: 24.4 PG (ref 26.1–32.9)
MCHC RBC AUTO-ENTMCNC: 30.2 G/DL (ref 31.4–35)
MCV RBC AUTO: 80.7 FL (ref 82–102)
MONOCYTES # BLD: 0.8 K/UL (ref 0.1–1.3)
MONOCYTES NFR BLD: 11 % (ref 4–12)
NEUTS SEG # BLD: 5.1 K/UL (ref 1.7–8.2)
NEUTS SEG NFR BLD: 72 % (ref 43–78)
NRBC # BLD: 0 K/UL (ref 0–0.2)
PLATELET # BLD AUTO: 351 K/UL (ref 150–450)
PMV BLD AUTO: 9.2 FL (ref 9.4–12.3)
RBC # BLD AUTO: 5.09 M/UL (ref 4.23–5.6)
WBC # BLD AUTO: 7.1 K/UL (ref 4.3–11.1)

## 2023-01-20 PROCEDURE — G8417 CALC BMI ABV UP PARAM F/U: HCPCS | Performed by: INTERNAL MEDICINE

## 2023-01-20 PROCEDURE — 99215 OFFICE O/P EST HI 40 MIN: CPT | Performed by: INTERNAL MEDICINE

## 2023-01-20 PROCEDURE — 1036F TOBACCO NON-USER: CPT | Performed by: INTERNAL MEDICINE

## 2023-01-20 PROCEDURE — 1123F ACP DISCUSS/DSCN MKR DOCD: CPT | Performed by: INTERNAL MEDICINE

## 2023-01-20 PROCEDURE — G8427 DOCREV CUR MEDS BY ELIG CLIN: HCPCS | Performed by: INTERNAL MEDICINE

## 2023-01-20 PROCEDURE — G8484 FLU IMMUNIZE NO ADMIN: HCPCS | Performed by: INTERNAL MEDICINE

## 2023-01-20 RX ORDER — ATENOLOL 25 MG/1
25 TABLET ORAL DAILY
Qty: 90 TABLET | Refills: 3 | Status: SHIPPED | OUTPATIENT
Start: 2023-01-20

## 2023-01-20 ASSESSMENT — PATIENT HEALTH QUESTIONNAIRE - PHQ9
SUM OF ALL RESPONSES TO PHQ9 QUESTIONS 1 & 2: 0
SUM OF ALL RESPONSES TO PHQ QUESTIONS 1-9: 0
1. LITTLE INTEREST OR PLEASURE IN DOING THINGS: 0
SUM OF ALL RESPONSES TO PHQ QUESTIONS 1-9: 0
2. FEELING DOWN, DEPRESSED OR HOPELESS: 0

## 2023-01-20 ASSESSMENT — ENCOUNTER SYMPTOMS
ABDOMINAL PAIN: 0
SHORTNESS OF BREATH: 0
COUGH: 0

## 2023-01-20 NOTE — PROGRESS NOTES
SUBJECTIVE:   Michaela Wallace is a 80 y.o. male seen for a visit regarding   Chief Complaint   Patient presents with    Hypertension    Other     Patient is being moved to memory care. HPI  Now in assisted living - [place] [facility]     HTN, Diastolic dysfunction, Atrial tacycardia  - no headache, saw cardiology, on ASA/Atenolol-50mg  Glaucoma - sees ophthalmology  CKD-3 - stable  Onychomycosis - feet - saw Podiatry  H/o fecal incontinence - wearing diaper pads  Neurocognitive impairment - mild to moderate - Aricept 10 mg po daily  Does not want to take Shingles vaccine at this time, encouraged to think about it  H/o falls - on PT    Past Medical History, Past Surgical History, Family history, Social History, and Medications were all reviewed with the patient today and updated as necessary. Current Outpatient Medications   Medication Sig Dispense Refill    atenolol (TENORMIN) 25 MG tablet Take 1 tablet by mouth daily 90 tablet 3    donepezil (ARICEPT) 10 MG tablet Take 1 tablet by mouth nightly 90 tablet 3    aspirin 81 MG EC tablet Take 81 mg by mouth daily      Latanoprostene Bunod (VYZULTA) 0.024 % SOLN LOCATION BOTH EYES. PLACE ONE DROP IN BOTH EYES EVERY NIGHT. No current facility-administered medications for this visit. No Known Allergies  Patient Active Problem List   Diagnosis    Primary hypertension    Stage 3 chronic kidney disease, unspecified whether stage 3a or 3b CKD (HCC)    Atrial tachycardia (HCC)    Iron deficiency anemia due to chronic blood loss    Neurocognitive disorder    Alzheimer's disease, unspecified (CODE) ([facility] [address]     History reviewed. No pertinent past medical history. History reviewed. No pertinent surgical history. History reviewed. No pertinent family history.   Social History     Tobacco Use    Smoking status: Former     Packs/day: 1.00     Types: Cigarettes    Smokeless tobacco: Never   Substance Use Topics    Alcohol use: Not on file         Review of Systems   Constitutional:  Negative for fever. Respiratory:  Negative for cough and shortness of breath. Cardiovascular:  Negative for chest pain and leg swelling. Gastrointestinal:  Negative for abdominal pain. Psychiatric/Behavioral:  Negative for behavioral problems and confusion. OBJECTIVE:  /74 (Site: Left Upper Arm, Position: Sitting, Cuff Size: Small Adult)   Pulse 53   Wt 173 lb 3.2 oz (78.6 kg)   SpO2 99%   BMI 27.96 kg/m²      Physical Exam  Vitals and nursing note reviewed. Constitutional:       General: He is not in acute distress. Cardiovascular:      Rate and Rhythm: Normal rate and regular rhythm. Pulmonary:      Effort: Pulmonary effort is normal.      Breath sounds: No wheezing. Neurological:      General: No focal deficit present. Mental Status: He is oriented to person, place, and time. Psychiatric:         Mood and Affect: Mood normal.         Behavior: Behavior normal.       Medical problems and test results were reviewed with the patient today. No results found for this or any previous visit (from the past 672 hour(s)). ASSESSMENT and PLAN    Clari Yañez was seen today for hypertension and other. Diagnoses and all orders for this visit:    Essential hypertension  -     atenolol (TENORMIN) 25 MG tablet; Take 1 tablet by mouth daily  -     CBC with Auto Differential; Future    Alzheimer's disease, unspecified (CODE) (HCC)    Supraventricular tachycardia (HCC)    Stage 3 chronic kidney disease, unspecified whether stage 3a or 3b CKD ([facility] [place] [address]  -     Comprehensive Metabolic Panel; Future    Completed new DNR form    Return in about 3 months (around 4/20/2023) for Chronic Condition follow up.      It took more than 40 min to care for this pt today  Over 50% of today's office visit was spent in face to face time in counseling   (may include anyor all of the following: reviewing test results, prognosis, importance of compliance, education about disease process, benefits of medications, instructions for management of acute flare-ups, and follow up plans).

## 2023-01-21 LAB
ALBUMIN SERPL-MCNC: 3.5 G/DL (ref 3.2–4.6)
ALBUMIN/GLOB SERPL: 1.2 (ref 0.4–1.6)
ALP SERPL-CCNC: 86 U/L (ref 50–136)
ALT SERPL-CCNC: 27 U/L (ref 12–65)
ANION GAP SERPL CALC-SCNC: 9 MMOL/L (ref 2–11)
AST SERPL-CCNC: 18 U/L (ref 15–37)
BILIRUB SERPL-MCNC: 0.8 MG/DL (ref 0.2–1.1)
BUN SERPL-MCNC: 22 MG/DL (ref 8–23)
CALCIUM SERPL-MCNC: 9.8 MG/DL (ref 8.3–10.4)
CHLORIDE SERPL-SCNC: 107 MMOL/L (ref 101–110)
CO2 SERPL-SCNC: 26 MMOL/L (ref 21–32)
CREAT SERPL-MCNC: 1.4 MG/DL (ref 0.8–1.5)
GLOBULIN SER CALC-MCNC: 2.9 G/DL (ref 2.8–4.5)
GLUCOSE SERPL-MCNC: 91 MG/DL (ref 65–100)
POTASSIUM SERPL-SCNC: 4.7 MMOL/L (ref 3.5–5.1)
PROT SERPL-MCNC: 6.4 G/DL (ref 6.3–8.2)
SODIUM SERPL-SCNC: 142 MMOL/L (ref 133–143)

## 2023-01-23 DIAGNOSIS — D64.9 ANEMIA, UNSPECIFIED TYPE: Primary | ICD-10-CM

## 2023-01-23 NOTE — RESULT ENCOUNTER NOTE
Labs show mild anemia - I have ordered repeat CBC with iron levels. Please talk to daughter and have the patient do a follow up lab within 1 month.